# Patient Record
(demographics unavailable — no encounter records)

---

## 2025-07-29 NOTE — CONSULT LETTER
[Dear  ___] : Dear  [unfilled], [Consult Letter:] : I had the pleasure of evaluating your patient, [unfilled]. [Please see my note below.] : Please see my note below. [Consult Closing:] : Thank you very much for allowing me to participate in the care of this patient.  If you have any questions, please do not hesitate to contact me. [Sincerely,] : Sincerely, [FreeTextEntry3] : Darwin Head MD Division of Pediatric Gastroenterology [facility]

## 2025-07-29 NOTE — HISTORY OF PRESENT ILLNESS
[de-identified] : 7 year old female with abdominal pain and irregular bowel pattern presents for follow up. Last seen in March 2024 at which time symptoms were felt to be secondary to a post infectious irritable bowel syndrome. Initial symptoms of fever and diarrhea when traveled to [place] in January 2024 with constipation and laxative use needed at time of visit in March as well as a probiotic Since that time, has had intermittent bouts of abdominal pain.  Last episode was one week ago which started and went away and then came back and lasted 5 days with lower and upper abdominal pain, brought to PMD and thought may be gastritis. Trialed famotidine and Mylanta.   Prior to that the end of June 2025 which lasted approx 1/2 hour with spasms.  Norovirus in February 2025 with vomiting. No c/o abd pain. BMs [place] 3, daily.  Diet Hx obtained ROS: eczema Mother neuroradiology at [facility] friends with Dr. Whitaker lives with parents, older brother with Aspergers Family Hx: mother with Hx recurrent C diff with adenomatous polyps with normal colonoscopy every 3 years, last 1 year ago  [de-identified] : probiotic and MVit

## 2025-07-29 NOTE — ASSESSMENT
[FreeTextEntry1] : 7 year old female with intermittent episodes of abdominal pain in association with acute infection with varying symptoms including abdominal pain, nausea, vomiting and/or diarrhea. 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  Agree with probiotic Discussed naturopathic approaches to abdominal pain